# Patient Record
Sex: FEMALE | Race: WHITE | Employment: OTHER | URBAN - METROPOLITAN AREA
[De-identification: names, ages, dates, MRNs, and addresses within clinical notes are randomized per-mention and may not be internally consistent; named-entity substitution may affect disease eponyms.]

---

## 2017-04-13 PROBLEM — R19.4 CHANGE IN BOWEL HABIT: Status: ACTIVE | Noted: 2017-04-13

## 2017-04-13 PROBLEM — R14.1 GAS PAIN: Status: ACTIVE | Noted: 2017-04-13

## 2017-05-19 ENCOUNTER — HOSPITAL ENCOUNTER (EMERGENCY)
Facility: HOSPITAL | Age: 77
Discharge: HOME | End: 2017-05-19
Attending: EMERGENCY MEDICINE
Payer: MEDICARE

## 2017-05-19 ENCOUNTER — APPOINTMENT (OUTPATIENT)
Dept: CT IMAGING | Facility: HOSPITAL | Age: 77
End: 2017-05-19
Attending: EMERGENCY MEDICINE
Payer: MEDICARE

## 2017-05-19 VITALS
BODY MASS INDEX: 23.73 KG/M2 | OXYGEN SATURATION: 96 % | HEIGHT: 64 IN | SYSTOLIC BLOOD PRESSURE: 132 MMHG | HEART RATE: 78 BPM | TEMPERATURE: 98 F | DIASTOLIC BLOOD PRESSURE: 80 MMHG | WEIGHT: 139 LBS | RESPIRATION RATE: 18 BRPM

## 2017-05-19 DIAGNOSIS — R07.89 CHEST PAIN, ATYPICAL: Primary | ICD-10-CM

## 2017-05-19 DIAGNOSIS — K31.89 GASTRIC MASS: ICD-10-CM

## 2017-05-19 PROCEDURE — 85025 COMPLETE CBC W/AUTO DIFF WBC: CPT | Performed by: EMERGENCY MEDICINE

## 2017-05-19 PROCEDURE — 84484 ASSAY OF TROPONIN QUANT: CPT | Performed by: EMERGENCY MEDICINE

## 2017-05-19 PROCEDURE — 93005 ELECTROCARDIOGRAM TRACING: CPT

## 2017-05-19 PROCEDURE — 71260 CT THORAX DX C+: CPT | Performed by: EMERGENCY MEDICINE

## 2017-05-19 PROCEDURE — 74160 CT ABDOMEN W/CONTRAST: CPT | Performed by: EMERGENCY MEDICINE

## 2017-05-19 PROCEDURE — 93010 ELECTROCARDIOGRAM REPORT: CPT | Performed by: EMERGENCY MEDICINE

## 2017-05-19 PROCEDURE — 99285 EMERGENCY DEPT VISIT HI MDM: CPT

## 2017-05-19 PROCEDURE — 83735 ASSAY OF MAGNESIUM: CPT | Performed by: EMERGENCY MEDICINE

## 2017-05-19 PROCEDURE — 36415 COLL VENOUS BLD VENIPUNCTURE: CPT

## 2017-05-19 PROCEDURE — 80053 COMPREHEN METABOLIC PANEL: CPT | Performed by: EMERGENCY MEDICINE

## 2017-05-19 RX ORDER — PANTOPRAZOLE SODIUM 40 MG/1
40 TABLET, DELAYED RELEASE ORAL DAILY
Qty: 30 TABLET | Refills: 0 | Status: SHIPPED | OUTPATIENT
Start: 2017-05-19 | End: 2017-06-18

## 2017-05-19 RX ORDER — SODIUM CHLORIDE 9 MG/ML
INJECTION, SOLUTION INTRAVENOUS ONCE
Status: COMPLETED | OUTPATIENT
Start: 2017-05-19 | End: 2017-05-19

## 2017-05-19 RX ORDER — LEVOTHYROXINE SODIUM 0.1 MG/1
100 TABLET ORAL
COMMUNITY

## 2017-05-19 NOTE — ED PROVIDER NOTES
Patient Seen in: [facility] AND [facility] Emergency Department    History   No chief complaint on file.     Stated Complaint: Chest pain/epigastric pain/back pain     HPI    77 yo F with PMH hypothyroid presenting with now resolved left sided chest discomfort with Soft. Nontender. Musculoskeletal: No gross deformity. BLE without calf tenderness/edema. Neurological: Alert. Skin: Skin is warm. Psychiatric: Cooperative. Nursing note and vitals reviewed.         ED Course     Labs Reviewed   COMP METABOLIC PANEL ( intra-abdominal process  Prominent stool throughout visualized colon.  Correlate for history of constipation  Gallbladder, pancreas, spleen, and kidneys are unremarkable    Heterogeneous mass in left upper quadrant that appears exophytic to the greater curv specifically without dissection/VTE, though with questionable GERD and gastric mass noted. Patient/ updated on latter and need for outpatient surgical followup - will provide on call surgery and copies of CT for followup.     Disposition and Plan

## 2017-05-19 NOTE — ED NOTES
Patient updated on current lab results at this time. Informed that next step is to get CT imaging. Patient ambulated steadily to the bathroom- denied any dizziness, weakness, sob, or CP while walking. Patient back in bed, comfort measures provided.  Cardiac

## 2017-05-19 NOTE — ED NOTES
Report given to [employer]. Patient updated on current results. Informed of pending CT results. Continuous cardiac monitoring resumed. Vitals remain stable. She denies any change in s/s- assessment remains the same.

## 2017-05-19 NOTE — ED NOTES
Patient to ED for new onset chest pain that began tonight. She states she has had roughly 15 min episodes of tightness in her chest w/ radiation to the mid \"bra-line\" area of her back. She states the pain is worst when laying down.  She took one 81mg aspi

## 2022-12-10 ENCOUNTER — HOSPITAL ENCOUNTER (OUTPATIENT)
Age: 82
Discharge: HOME | End: 2022-12-10
Payer: COMMERCIAL

## 2022-12-10 ENCOUNTER — HOSPITAL ENCOUNTER (OUTPATIENT)
Dept: CT IMAGING | Age: 82
Discharge: HOME | End: 2022-12-10
Attending: NURSE PRACTITIONER
Payer: COMMERCIAL

## 2022-12-10 VITALS
OXYGEN SATURATION: 98 % | TEMPERATURE: 98 F | DIASTOLIC BLOOD PRESSURE: 69 MMHG | HEIGHT: 64 IN | WEIGHT: 142 LBS | BODY MASS INDEX: 24.24 KG/M2 | HEART RATE: 109 BPM | RESPIRATION RATE: 13 BRPM | SYSTOLIC BLOOD PRESSURE: 155 MMHG

## 2022-12-10 DIAGNOSIS — R07.89 CHEST WALL PAIN: Primary | ICD-10-CM

## 2022-12-10 LAB
ATRIAL RATE: 82 BPM
P AXIS: 70 DEGREES
P-R INTERVAL: 160 MS
Q-T INTERVAL: 368 MS
QRS DURATION: 92 MS
QTC CALCULATION (BEZET): 429 MS
R AXIS: -33 DEGREES
T AXIS: 47 DEGREES
VENTRICULAR RATE: 82 BPM

## 2022-12-10 PROCEDURE — 93005 ELECTROCARDIOGRAM TRACING: CPT

## 2022-12-10 PROCEDURE — 71250 CT THORAX DX C-: CPT | Performed by: NURSE PRACTITIONER

## 2022-12-10 PROCEDURE — 99203 OFFICE O/P NEW LOW 30 MIN: CPT

## 2022-12-10 PROCEDURE — 93010 ELECTROCARDIOGRAM REPORT: CPT

## 2022-12-10 PROCEDURE — 99204 OFFICE O/P NEW MOD 45 MIN: CPT
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
## 2022-12-10 NOTE — DISCHARGE INSTRUCTIONS
Take Tylenol for pain. If you do take ibuprofen take only 400 mg with food twice daily as this medication can cause upset stomach. You may apply ice pack to areas of discomfort. Follow-up with your primary care provider 2 to 3 days if you develop any new or worsening symptoms go to the nearest emergency department.

## 2023-05-25 ENCOUNTER — HOSPITAL ENCOUNTER (OUTPATIENT)
Age: 83
Discharge: HOME | End: 2023-05-25
Payer: MEDICARE

## 2023-05-25 VITALS
OXYGEN SATURATION: 100 % | TEMPERATURE: 98 F | SYSTOLIC BLOOD PRESSURE: 154 MMHG | RESPIRATION RATE: 20 BRPM | DIASTOLIC BLOOD PRESSURE: 82 MMHG | HEART RATE: 89 BPM

## 2023-05-25 DIAGNOSIS — R21 RASH: Primary | ICD-10-CM

## 2023-05-25 PROCEDURE — 99213 OFFICE O/P EST LOW 20 MIN: CPT

## 2023-05-25 RX ORDER — TRIAMCINOLONE ACETONIDE 1 MG/G
CREAM TOPICAL 2 TIMES DAILY
Qty: 45 G | Refills: 0 | Status: SHIPPED | OUTPATIENT
Start: 2023-05-25 | End: 2023-06-04

## 2023-05-25 NOTE — ED INITIAL ASSESSMENT (HPI)
Rash to right side of armpit, breast and hip since Tuesday, pt received her shingles shot 4 days prior, minimal itching ,no fever

## 2023-10-13 ENCOUNTER — HOSPITAL ENCOUNTER (OUTPATIENT)
Age: 83
Discharge: HOME | End: 2023-10-13
Payer: MEDICARE

## 2023-10-13 ENCOUNTER — APPOINTMENT (OUTPATIENT)
Dept: GENERAL RADIOLOGY | Age: 83
End: 2023-10-13
Attending: Physician Assistant
Payer: MEDICARE

## 2023-10-13 VITALS
SYSTOLIC BLOOD PRESSURE: 153 MMHG | DIASTOLIC BLOOD PRESSURE: 74 MMHG | TEMPERATURE: 99 F | OXYGEN SATURATION: 94 % | HEART RATE: 74 BPM | RESPIRATION RATE: 18 BRPM

## 2023-10-13 DIAGNOSIS — R05.9 COUGH, UNSPECIFIED TYPE: Primary | ICD-10-CM

## 2023-10-13 LAB
#MXD IC: 1.2 X10ˆ3/UL (ref 0.1–1)
BUN BLD-MCNC: 14 MG/DL (ref 7–18)
CHLORIDE BLD-SCNC: 100 MMOL/L (ref 98–112)
CO2 BLD-SCNC: 24 MMOL/L (ref 21–32)
CREAT BLD-MCNC: 1 MG/DL
DDIMER WHOLE BLOOD: 212 NG/ML DDU (ref ?–400)
EGFRCR SERPLBLD CKD-EPI 2021: 57 ML/MIN/1.73M2 (ref 60–?)
GLUCOSE BLD-MCNC: 97 MG/DL (ref 70–99)
HCT VFR BLD AUTO: 38.1 %
HCT VFR BLD CALC: 41 %
HGB BLD-MCNC: 13 G/DL
ISTAT IONIZED CALCIUM FOR CHEM 8: 1.19 MMOL/L (ref 1.12–1.32)
LYMPHOCYTES # BLD AUTO: 1 X10ˆ3/UL (ref 1–4)
LYMPHOCYTES NFR BLD AUTO: 11 %
MCH RBC QN AUTO: 30.4 PG (ref 26–34)
MCHC RBC AUTO-ENTMCNC: 34.1 G/DL (ref 31–37)
MCV RBC AUTO: 89.2 FL (ref 80–100)
MIXED CELL %: 13.2 %
NEUTROPHILS # BLD AUTO: 7.1 X10ˆ3/UL (ref 1.5–7.7)
NEUTROPHILS NFR BLD AUTO: 75.8 %
PLATELET # BLD AUTO: 341 X10ˆ3/UL (ref 150–450)
POTASSIUM BLD-SCNC: 3.9 MMOL/L (ref 3.6–5.1)
RBC # BLD AUTO: 4.27 X10ˆ6/UL
SODIUM BLD-SCNC: 136 MMOL/L (ref 136–145)
TROPONIN I BLD-MCNC: <0.02 NG/ML
WBC # BLD AUTO: 9.3 X10ˆ3/UL (ref 4–11)

## 2023-10-13 PROCEDURE — 80047 BASIC METABLC PNL IONIZED CA: CPT

## 2023-10-13 PROCEDURE — 99214 OFFICE O/P EST MOD 30 MIN: CPT

## 2023-10-13 PROCEDURE — 36415 COLL VENOUS BLD VENIPUNCTURE: CPT

## 2023-10-13 PROCEDURE — 93010 ELECTROCARDIOGRAM REPORT: CPT

## 2023-10-13 PROCEDURE — 71046 X-RAY EXAM CHEST 2 VIEWS: CPT | Performed by: PHYSICIAN ASSISTANT

## 2023-10-13 PROCEDURE — 84484 ASSAY OF TROPONIN QUANT: CPT

## 2023-10-13 PROCEDURE — 93005 ELECTROCARDIOGRAM TRACING: CPT

## 2023-10-13 PROCEDURE — 85025 COMPLETE CBC W/AUTO DIFF WBC: CPT | Performed by: PHYSICIAN ASSISTANT

## 2023-10-13 PROCEDURE — 85378 FIBRIN DEGRADE SEMIQUANT: CPT | Performed by: PHYSICIAN ASSISTANT

## 2023-10-13 RX ORDER — BENZONATATE 100 MG/1
100 CAPSULE ORAL 3 TIMES DAILY PRN
Qty: 30 CAPSULE | Refills: 0 | Status: SHIPPED | OUTPATIENT
Start: 2023-10-13 | End: 2023-11-12

## 2023-10-13 RX ORDER — IPRATROPIUM BROMIDE AND ALBUTEROL SULFATE 2.5; .5 MG/3ML; MG/3ML
3 SOLUTION RESPIRATORY (INHALATION) ONCE
Status: COMPLETED | OUTPATIENT
Start: 2023-10-13 | End: 2023-10-13

## 2023-10-13 NOTE — ED INITIAL ASSESSMENT (HPI)
Patient with 2 weeks of cough and wheezing  Hx of asthma, patient has tried Ventolin and Flonase at home   Patient with episodes of epigastric spasms  No SOB   - chest pains  - fever  Patient with negative home COVID tests

## 2023-10-13 NOTE — DISCHARGE INSTRUCTIONS
Tessalon as needed for cough up to 3 times daily   Use your inhaler as directed   Tylenol as needed for pain or fever   Drink plenty of fluids   Get plenty of rest     You may benefit from taking a decongestant (e.g. Sudafed)  You may benefit from taking a daily allergy medication (e.g. Zyrtec)  You may benefit from using a humidifier    Sleep with head elevated and avoid laying flat  Avoid having air blow on your face    Wash hands often  Disinfect your environment  Do not share utensils or drinks    Symptoms may take a few weeks to resolve  Follow up with your primary care provider

## 2023-10-14 LAB
ATRIAL RATE: 84 BPM
P AXIS: 78 DEGREES
P-R INTERVAL: 154 MS
Q-T INTERVAL: 368 MS
QRS DURATION: 88 MS
QTC CALCULATION (BEZET): 434 MS
R AXIS: -37 DEGREES
T AXIS: 38 DEGREES
VENTRICULAR RATE: 84 BPM

## 2024-08-15 ENCOUNTER — APPOINTMENT (OUTPATIENT)
Dept: GENERAL RADIOLOGY | Facility: HOSPITAL | Age: 84
End: 2024-08-15
Attending: EMERGENCY MEDICINE
Payer: MEDICARE

## 2024-08-15 ENCOUNTER — HOSPITAL ENCOUNTER (EMERGENCY)
Facility: HOSPITAL | Age: 84
Discharge: HOME | End: 2024-08-15
Attending: EMERGENCY MEDICINE
Payer: MEDICARE

## 2024-08-15 VITALS
BODY MASS INDEX: 23.9 KG/M2 | DIASTOLIC BLOOD PRESSURE: 66 MMHG | TEMPERATURE: 98 F | HEART RATE: 74 BPM | OXYGEN SATURATION: 92 % | SYSTOLIC BLOOD PRESSURE: 133 MMHG | RESPIRATION RATE: 19 BRPM | HEIGHT: 64 IN | WEIGHT: 140 LBS

## 2024-08-15 DIAGNOSIS — S01.81XA CHIN LACERATION, INITIAL ENCOUNTER: ICD-10-CM

## 2024-08-15 DIAGNOSIS — S49.91XA INJURY OF RIGHT SHOULDER, INITIAL ENCOUNTER: ICD-10-CM
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
## 2024-08-15 PROCEDURE — 73030 X-RAY EXAM OF SHOULDER: CPT | Performed by: EMERGENCY MEDICINE

## 2024-08-15 PROCEDURE — 99284 EMERGENCY DEPT VISIT MOD MDM: CPT

## 2024-08-15 PROCEDURE — 12011 RPR F/E/E/N/L/M 2.5 CM/<: CPT

## 2024-08-15 PROCEDURE — 73130 X-RAY EXAM OF HAND: CPT | Performed by: EMERGENCY MEDICINE

## 2024-08-15 RX ORDER — LIDOCAINE HCL/EPINEPHRINE/PF 2%-1:200K
20 VIAL (ML) INJECTION ONCE
Status: COMPLETED | OUTPATIENT
Start: 2024-08-15 | End: 2024-08-15

## 2024-08-15 RX ORDER — ACETAMINOPHEN 500 MG
1000 TABLET ORAL ONCE
Status: COMPLETED | OUTPATIENT
Start: 2024-08-15 | End: 2024-08-15

## 2024-08-15 NOTE — ED INITIAL ASSESSMENT (HPI)
Pt with mechanical fall prior to arrival. + laceration to chin. Gauze given in triage and instructed to hold pressure. Last tenus within 10 years. Abrasion to right thumb. C/o right shoulder pain. Denies LOC, loose teeth

## 2024-08-16 NOTE — ED PROVIDER NOTES
Patient Seen in: [facility] Emergency Department      History     Chief Complaint   Patient presents with    Trauma     Stated Complaint: fall    Subjective:   HPI        Objective:   Past Medical History:    Breast cancer (HCC)              History reviewed. No pertinent surgical history.             Social History     Socioeconomic History    Marital status:    Tobacco Use    Smoking status: Former    Tobacco comments:     quit smoking 45 years ago   Vaping Use    Vaping status: Never Used   Substance and Sexual Activity    Alcohol use: Not Currently    Drug use: Not Currently     Social Determinants of Health     Financial Resource Strain: Low Risk  (4/7/2024)    Received from [facility]    Overall Financial Resource Strain (CARDIA)     Difficulty of Paying Living Expenses: Not hard at all   Food Insecurity: No Food Insecurity (4/7/2024)    Received from [facility]    Hunger Vital Sign     Worried About Running Out of Food in the Last Year: Never true     Ran Out of Food in the Last Year: Never true   Transportation Needs: No Transportation Needs (4/7/2024)    Received from [facility]    PRAPARE - Transportation     Lack of Transportation (Medical): No     Lack of Transportation (Non-Medical): No   Housing Stability: Low Risk  (4/7/2024)    Received from [facility]    Housing Stability Vital Sign     Unable to Pay for Housing in the Last Year: No     Number of Places Lived in the Last Year: 1     In the last 12 months, was there a time when you did not have a steady place to sleep or slept in a shelter (including now)?: No              Review of Systems    Positive for stated Chief Complaint: Trauma    Other systems are as noted in HPI.  Constitutional and vital signs reviewed.      All other systems reviewed and negative except as noted above.    Physical Exam     ED Triage Vitals [08/15/24 1727]   BP (!)  167/82   Pulse 88   Resp 19   Temp 98.1 °F (36.7 °C)   Temp src Oral   SpO2 96 %   O2 Device None (Room air)       Current Vitals:   Vital Signs  BP: 133/66  Pulse: 74  Resp: 19  Temp: 98.1 °F (36.7 °C)  Temp src: Oral  MAP (mmHg): 87    Oxygen Therapy  SpO2: 92 %  O2 Device: None (Room air)            Physical Exam          ED Course   Labs Reviewed - No data to display                   MDM      83-year-old female history of hypertension, hypothyroidism, osteopenia presents today after a fall with a laceration under her chin.  Patient states that she tripped while walking, fell forward, and struck her face on the ground.  Also has an abrasion and some pain in her right thumb and pain in her right shoulder.  Denies any preceding symptoms including chest pain, palpitations, lightheadedness, headache, or weakness.  No new medications or abnormal ingestions.  Denies loss of consciousness.  Not taking anticoagulation.    On exam, vitals normal, well-appearing.  Normal mental status.  1.5 cm laceration on the mentum.  No obvious dental injury.  No tenderness of the C-spine.  Small abrasion at the base of the right thumb with some tenderness.  Some tenderness of the right shoulder without deformity.    Differential: Chin laceration, right shoulder pain, right thumb pain    I personally reviewed the x-rays and agree with the radiologist read: no acute fracture visualized.    Laceration repair:    Verbal consent was obtained from the patient.  The 1.5 cm laceration located chin was anesthetized in the usual fashion. The wound was scrubbed, draped and explored.   There were no deep structures involved.  No tendon injury was identified.  The wound was repaired with 6 running sutures with 5-0 prolene .  The wound repair was simple.  The procedure was performed by myself.                                     [facility]    Disposition and Plan     Clinical Impression:  1. Fall, initial encounter    2. Chin laceration, initial encounter     3. Injury of right shoulder, initial encounter         Disposition:  Discharge  8/15/2024  8:15 pm    Follow-up:  Brenda Herrera MD  [address] [address]  [phone]    Follow up in 1 week(s)  For suture removal    We recommend that you schedule follow up care with a primary care provider within the next three months to obtain basic health screening including reassessment of your blood pressure.      Medications Prescribed:  Discharge Medication List as of 8/15/2024  8:36 PM

## 2024-08-23 ENCOUNTER — HOSPITAL ENCOUNTER (OUTPATIENT)
Age: 84
Discharge: HOME | End: 2024-08-23
Attending: EMERGENCY MEDICINE
Payer: MEDICARE

## 2024-08-23 VITALS
OXYGEN SATURATION: 98 % | RESPIRATION RATE: 20 BRPM | DIASTOLIC BLOOD PRESSURE: 67 MMHG | SYSTOLIC BLOOD PRESSURE: 155 MMHG | TEMPERATURE: 98 F | HEART RATE: 71 BPM

## 2024-08-23 DIAGNOSIS — Z48.02 ENCOUNTER FOR REMOVAL OF SUTURES: Primary | ICD-10-CM

## 2024-08-23 NOTE — ED INITIAL ASSESSMENT (HPI)
S/p fall on 8/15 , was seen in the ED, here for suture removal - chin laceration, wound healing well

## 2024-08-23 NOTE — DISCHARGE INSTRUCTIONS
Leave steri strips in place  When they fall off can use aquaphor on wound  Leave open to air, clean and dry for several hours each day

## 2024-08-25 NOTE — ED PROVIDER NOTES
Patient Seen in: [facility]      History     Chief Complaint   Patient presents with    Sut Stap RingRemoval     Stated Complaint: suture removal    Subjective:   HPI    Chin laceration repaired in ED one week ago. Here for suture removal. She has no complaints.     Objective:   Past Medical History:    Breast cancer (HCC)              History reviewed. No pertinent surgical history.             Social History     Socioeconomic History    Marital status:    Tobacco Use    Smoking status: Former    Tobacco comments:     quit smoking 45 years ago   Vaping Use    Vaping status: Never Used   Substance and Sexual Activity    Alcohol use: Not Currently    Drug use: Not Currently     Social Determinants of Health     Financial Resource Strain: Low Risk  (4/7/2024)    Received from [facility]    Overall Financial Resource Strain (CARDIA)     Difficulty of Paying Living Expenses: Not hard at all   Food Insecurity: No Food Insecurity (4/7/2024)    Received from [facility]    Hunger Vital Sign     Worried About Running Out of Food in the Last Year: Never true     Ran Out of Food in the Last Year: Never true   Transportation Needs: No Transportation Needs (4/7/2024)    Received from [facility]    PRAPARE - Transportation     Lack of Transportation (Medical): No     Lack of Transportation (Non-Medical): No   Housing Stability: Low Risk  (4/7/2024)    Received from [facility]    Housing Stability Vital Sign     Unable to Pay for Housing in the Last Year: No     Number of Places Lived in the Last Year: 1     In the last 12 months, was there a time when you did not have a steady place to sleep or slept in a shelter (including now)?: No              Review of Systems    Positive for stated Chief Complaint: Sut Stap RingRemoval    Other systems are as noted in HPI.  Constitutional and vital signs reviewed.      All other  systems reviewed and negative except as noted above.    Physical Exam     ED Triage Vitals [08/23/24 0837]   /67   Pulse 71   Resp 20   Temp 97.5 °F (36.4 °C)   Temp src Temporal   SpO2 98 %   O2 Device None (Room air)       Current Vitals:   No data recorded        Physical Exam  Vitals and nursing note reviewed.   Constitutional:       Appearance: Normal appearance. She is well-developed.   HENT:      Head: Normocephalic and atraumatic.   Cardiovascular:      Rate and Rhythm: Normal rate and regular rhythm.   Pulmonary:      Effort: Pulmonary effort is normal. No respiratory distress.   Skin:     Capillary Refill: Capillary refill takes less than 2 seconds.      Comments: Chin laceration with significant scabbing. Minimal erythema. No purulent drainage.    Neurological:      General: No focal deficit present.      Mental Status: She is alert.      Sensory: No sensory deficit.   Psychiatric:         Mood and Affect: Mood normal.         Behavior: Behavior normal.              ED Course   Labs Reviewed - No data to display            Running stitch removed by myself. Patient tolerated well. Steri strips applied by myself.        MDM                                      Medical Decision Making    Suture removal and steri strip placement as above.   Disposition and Plan     Clinical Impression:  1. Encounter for removal of sutures         Disposition:  Discharge  8/23/2024  9:16 am    Follow-up:  Brenda Herrera MD  [address] [address]  [phone]      As needed          Medications Prescribed:  Discharge Medication List as of 8/23/2024  9:18 AM

## 2025-06-01 ENCOUNTER — HOSPITAL ENCOUNTER (OUTPATIENT)
Age: 85
Discharge: HOME | End: 2025-06-01
Payer: MEDICARE

## 2025-06-01 ENCOUNTER — APPOINTMENT (OUTPATIENT)
Dept: GENERAL RADIOLOGY | Age: 85
End: 2025-06-01
Attending: EMERGENCY MEDICINE
Payer: MEDICARE

## 2025-06-01 VITALS
OXYGEN SATURATION: 95 % | HEART RATE: 88 BPM | SYSTOLIC BLOOD PRESSURE: 137 MMHG | RESPIRATION RATE: 18 BRPM | DIASTOLIC BLOOD PRESSURE: 70 MMHG | TEMPERATURE: 98 F

## 2025-06-01 DIAGNOSIS — J98.01 BRONCHOSPASM: ICD-10-CM

## 2025-06-01 DIAGNOSIS — J01.40 ACUTE NON-RECURRENT PANSINUSITIS: Primary | ICD-10-CM

## 2025-06-01 PROCEDURE — 94640 AIRWAY INHALATION TREATMENT: CPT

## 2025-06-01 PROCEDURE — 99214 OFFICE O/P EST MOD 30 MIN: CPT

## 2025-06-01 PROCEDURE — 71046 X-RAY EXAM CHEST 2 VIEWS: CPT | Performed by: EMERGENCY MEDICINE

## 2025-06-01 RX ORDER — IPRATROPIUM BROMIDE AND ALBUTEROL SULFATE 2.5; .5 MG/3ML; MG/3ML
3 SOLUTION RESPIRATORY (INHALATION) ONCE
Status: COMPLETED | OUTPATIENT
Start: 2025-06-01 | End: 2025-06-01

## 2025-06-01 RX ORDER — AZITHROMYCIN 250 MG/1
TABLET, FILM COATED ORAL
Qty: 6 TABLET | Refills: 0 | Status: SHIPPED | OUTPATIENT
Start: 2025-06-01 | End: 2025-06-06

## 2025-06-01 RX ORDER — ALBUTEROL SULFATE 90 UG/1
INHALANT RESPIRATORY (INHALATION)
Qty: 1 EACH | Refills: 0 | Status: SHIPPED | OUTPATIENT
Start: 2025-06-01

## 2025-06-01 RX ORDER — METHYLPREDNISOLONE 4 MG/1
TABLET ORAL
Qty: 1 EACH | Refills: 0 | Status: SHIPPED | OUTPATIENT
Start: 2025-06-01

## 2025-06-01 NOTE — DISCHARGE INSTRUCTIONS
3 prescriptions were sent to the pharmacy.  We are seeing these coughs linger between 6 to 8 weeks.  Usually the first week is worse, and its the same in the second week.  By the 3rd and 4th week you should start getting better by every third day.  Continue doing cold medication such as Mucinex, or other cold meds. Stay away from \"DM\" meds.   WAIT-and-see Medrol Dosepak.  Try using the inhaler today, and tomorrow every 4-6 hours for your cough.  See directions below for inhaler use.  Also call your primary care tomorrow let them know that you are seen at the urgent care and we prescribed you 3 different medications including Medrol Dosepak steroid, azithromycin antibiotic, and albuterol inhaler.  Azithromycin otherwise known as Z-Jad is an antibiotic and we sent it to the pharmacy to help treat your sinus infection.  It does not treat symptoms such as cough, congestion, or pain, but you will notice your symptoms lessening after the antibiotic is fighting the bacteria in the next few days.  Albuterol inhaler every 4-6 hours as needed for coughing spasms, and Tessalon Perles as needed for cough you can take   Contact your primary care if your cough is getting worse after another 2 weeks. Avoid milk/cheese products.  This can increase mucus production causing her cough to be worse, and increase inflammation.

## 2025-06-01 NOTE — ED INITIAL ASSESSMENT (HPI)
Patient with sinus drainage for several weeks and wet cough     No recent fever  Using Flonase and zyrtec

## 2025-06-01 NOTE — ED PROVIDER NOTES
Patient Seen in: [facility]        History  Chief Complaint   Patient presents with    Nasal Congestion    Cough/URI     Stated Complaint: flu like sym    Subjective:   HPI          Jennifer Bolivar is a 84 year old female here for nasal, congestion, and cough that started 4 to 6 weeks ago.  Feels her symptoms are worse at nighttime.  No exertional symptoms such as shortness of breath, or chest pain.  No orthopnea.  Mild conservative treat with minimal relief.        Objective:     No pertinent past medical history.            No pertinent past surgical history.              No pertinent social history.            Review of Systems    Positive for stated complaint: flu like sym  Other systems are as noted in HPI.  Constitutional and vital signs reviewed.      All other systems reviewed and negative except as noted above.                  Physical Exam    ED Triage Vitals [06/01/25 0816]   BP (!) 167/70   Pulse 88   Resp 18   Temp 97.9 °F (36.6 °C)   Temp src Oral   SpO2 95 %   O2 Device None (Room air)       Current Vitals:   Vital Signs  BP: 137/70  Pulse: 88  Resp: 18  Temp: 97.9 °F (36.6 °C)  Temp src: Oral    Oxygen Therapy  SpO2: 95 %  O2 Device: None (Room air)            Physical Exam  Vitals and nursing note reviewed.   Constitutional:       General: She is not in acute distress.     Appearance: Normal appearance. She is not ill-appearing.   HENT:      Head: Normocephalic.      Right Ear: Tympanic membrane, ear canal and external ear normal.      Left Ear: Tympanic membrane, ear canal and external ear normal.      Nose: Nose normal.      Mouth/Throat:      Mouth: Mucous membranes are moist.      Pharynx: Oropharynx is clear. No posterior oropharyngeal erythema.   Eyes:      Extraocular Movements: Extraocular movements intact.      Conjunctiva/sclera: Conjunctivae normal.      Pupils: Pupils are equal, round, and reactive to light.   Cardiovascular:      Rate and Rhythm: Normal rate.       Pulses: Normal pulses.   Pulmonary:      Effort: Pulmonary effort is normal.      Breath sounds: Wheezing (rul. cleared after neb. anterior cleared with cough) and rhonchi present.   Musculoskeletal:         General: Normal range of motion.      Cervical back: No rigidity.   Skin:     Capillary Refill: Capillary refill takes less than 2 seconds.      Findings: No erythema or rash.   Neurological:      General: No focal deficit present.      Mental Status: She is alert and oriented to person, place, and time.   Psychiatric:         Mood and Affect: Mood normal.         Behavior: Behavior normal.         Thought Content: Thought content normal.         Judgment: Judgment normal.                 ED Course  Labs Reviewed - No data to display                         MDM            Medical Decision Making    Ddx: URI vs LRI, allergies, reactive, COVID, FLU, RSV, strep, or somatic causes of symptoms    Tx for: Bronchospasm with sinusitis.  ABx sent to the pharmacy take as directed along with inhaler use.  Wheezing better after duo neb treatment.  Wait-and-see Medrol Dosepak sent to the pharmacy.  If symptoms are getting better with albuterol inhaler she can to defer the Dosepak, or contact primary care provider for further advice.  If cough is slightly better, and still continuing to having coughing spasms.  Advise starting Medrol Dosepak Wednesday morning.  Supportive care include but not limited to otc medications if there is no contraindication, cool mist humidifier, and oral hydration.    Avoid dairy if possible; This increases mucus production.  No stridor, chest pain, shortness of breath, no hot muffled speech, and no signs of compromise. Tolerating PO. Neuro wnl.   Reinforced ER precautions, and f/u care as needed. All questions answered, and reassurance given. No acute distress and cleared for home.      Problems Addressed:  Acute non-recurrent pansinusitis: acute illness or injury  Bronchospasm: acute illness or  injury    Amount and/or Complexity of Data Reviewed  External Data Reviewed: notes.  Labs: ordered. Decision-making details documented in ED Course.     Details: Independent interpretation. Reviewed with patient  Radiology: ordered and independent interpretation performed. Decision-making details documented in ED Course.     Details: After independent interpretation I agree with radiologist No acute findings    Risk  OTC drugs.  Prescription drug management.        Disposition and Plan     Clinical Impression:  1. Acute non-recurrent pansinusitis    2. Bronchospasm         Disposition:  Discharge  6/1/2025  9:06 am    Follow-up:  Brenda Herrera MD  [address] [place] [address]  [phone]                Medications Prescribed:  Discharge Medication List as of 6/1/2025  9:11 AM        START taking these medications    Details   azithromycin (ZITHROMAX Z-NINA) 250 MG Oral Tab 500 mg once followed by 250 mg daily x 4 days, Normal, Disp-6 tablet, R-0      albuterol 108 (90 Base) MCG/ACT Inhalation Aero Soln Inhale 1 puff and hold breath for 10 seconds then exhale.  Wait 1 full minute and repeat for second puff.  Use every 4-6 hours as needed., Normal, Disp-1 each, R-0NPI [id number]. Collaborating MD Edith Taylor.      methylPREDNISolone (MEDROL) 4 MG Oral Tablet Therapy Pack Dosepack: take as directed, Normal, Disp-1 each, R-0                   Supplementary Documentation:

## (undated) NOTE — ED AVS SNAPSHOT
[facility] Department    [address]     [place] [address]    Phone:  [phone]    Fax:  [phone]           Tamiko Enzo   MRN: [id number]    Department:  [facility]   Date of Visit: You can get these medications from any pharmacy     Bring a paper prescription for each of these medications    - Pantoprazole Sodium 40 MG Tbec            Discharge References/Attachments     CHEST PAIN, UNCERTAIN CAUSE (ENGLISH)    GERD (ADULT) (ENGLISH) visiting [email or web address].    IF THERE IS ANY CHANGE OR WORSENING OF YOUR CONDITION, CALL YOUR PRIMARY CARE PHYSICIAN AT ONCE OR RETURN IMMEDIATELY TO THE EMERGENCY DEPARTMENT.     If you have been prescribed any medication(s), please fill your prescription - If you have concerns related to behavioral health issues or thoughts of harming yourself, contact [address] [place] [address] at [phone].     - If you don’t have insurance, [employer] has partnered with Patient [employer]

## (undated) NOTE — ED AVS SNAPSHOT
[facility] Emergency Department    [address]     [place] [address]    Phone:  [phone]    Fax:  [phone]           Monica Refugio   MRN: [id number]    Department:  [facility] Emergency [facility]   Date of Visit: and Class Registration line at [phone] or find a doctor online by visiting [email or web address].    IF THERE IS ANY CHANGE OR WORSENING OF YOUR CONDITION, CALL YOUR PRIMARY CARE PHYSICIAN AT ONCE OR RETURN IMMEDIATELY TO [address] [place] [address].     If